# Patient Record
Sex: FEMALE
[De-identification: names, ages, dates, MRNs, and addresses within clinical notes are randomized per-mention and may not be internally consistent; named-entity substitution may affect disease eponyms.]

---

## 2017-05-11 ENCOUNTER — HOSPITAL ENCOUNTER (OUTPATIENT)
Dept: HOSPITAL 31 - C.SDS | Age: 72
Discharge: HOME | End: 2017-05-11
Payer: COMMERCIAL

## 2017-05-11 VITALS
DIASTOLIC BLOOD PRESSURE: 65 MMHG | HEART RATE: 71 BPM | SYSTOLIC BLOOD PRESSURE: 105 MMHG | OXYGEN SATURATION: 95 % | RESPIRATION RATE: 18 BRPM | TEMPERATURE: 97.6 F

## 2017-05-11 DIAGNOSIS — I83.812: Primary | ICD-10-CM

## 2017-05-11 DIAGNOSIS — D21.22: ICD-10-CM

## 2017-05-11 PROCEDURE — 27615 RESECT LEG/ANKLE TUM < 5 CM: CPT

## 2017-05-11 PROCEDURE — 88307 TISSUE EXAM BY PATHOLOGIST: CPT

## 2017-05-11 PROCEDURE — 97116 GAIT TRAINING THERAPY: CPT

## 2017-05-11 PROCEDURE — 97161 PT EVAL LOW COMPLEX 20 MIN: CPT

## 2017-05-11 PROCEDURE — 88304 TISSUE EXAM BY PATHOLOGIST: CPT

## 2017-05-11 PROCEDURE — 37765 STAB PHLEB VEINS XTR 10-20: CPT

## 2017-05-11 NOTE — OP
PROCEDURE DATE: 05/11/2017



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
nkle.



POSTOPERATIVE DIAGNOSES:  Painful varicose veins of the left leg with a soft tissue mass of the left 
ankle.



PROCEDURES PERFORMED:

1.  Stab phlebectomy of varicose veins of the left leg.

2.  Wide and deep excision soft tissue tumor of the left ankle with adjacent tissue transfer closure.




SURGEON:  Pratik Hernandez MD



ANESTHESIA:  General.



ESTIMATED BLOOD LOSS:  30 mL.



POSTOPERATIVE CONDITION:  Stable.



INDICATIONS FOR SURGERY:  This is a 72-year-old female with varicose veins and a soft tissue tumor of
 her left leg, who will now undergo operative therapy.



PROCEDURE:  The patient taken to the operating room, placed in supine position.  Preoperatively, in t
he holding area, the varicose veins had been marked with an indelible marker while she was standing u
p.  The soft tissue tumor was also marked.  A stab phlebectomy was performed of a cluster of varicose
 veins in the left knee and calf area.  Bleeding was controlled using the Bovie.  All the incisions w
ere closed using subcuticular Monocryl sutures.  Next, an elliptical incision was made over the soft 
tissue mass above the lateral left ankle.  It was dissected down to the fascia and completely removed
.  Bleeding was controlled using the Bovie.  A larger blood vessel was repaired.  Generous tissue fla
ps were raised using the Bovie and a greater than 30 square cm adjacent tissue transfer closure was p
erformed using multiple layers of Monocryl, subcuticular Monocryl, and glue.  The patient tolerated t
he procedure well, returned to recovery room in stable condition.





__________________________________________

Pratik Hernandez MD







cc:



DD: 05/11/2017 12:49:28  [id number]

TT: 05/11/2017 13:33:24

Job # [id number]

en

## 2018-07-24 ENCOUNTER — HOSPITAL ENCOUNTER (EMERGENCY)
Dept: HOSPITAL 14 - H.ER | Age: 73
Discharge: HOME | End: 2018-07-24
Payer: COMMERCIAL

## 2018-07-24 VITALS
SYSTOLIC BLOOD PRESSURE: 130 MMHG | HEART RATE: 76 BPM | RESPIRATION RATE: 18 BRPM | TEMPERATURE: 99 F | OXYGEN SATURATION: 98 % | DIASTOLIC BLOOD PRESSURE: 74 MMHG

## 2018-07-24 VITALS — BODY MASS INDEX: 29.7 KG/M2

## 2018-07-24 DIAGNOSIS — N81.2: ICD-10-CM

## 2018-07-24 DIAGNOSIS — G40.909: ICD-10-CM

## 2018-07-24 DIAGNOSIS — N39.0: Primary | ICD-10-CM

## 2018-07-24 DIAGNOSIS — I10: ICD-10-CM

## 2018-07-24 DIAGNOSIS — E78.00: ICD-10-CM

## 2018-07-24 LAB
BILIRUB UR-MCNC: NEGATIVE MG/DL
COLOR UR: YELLOW
GLUCOSE UR STRIP-MCNC: (no result) MG/DL
LEUKOCYTE ESTERASE UR-ACNC: (no result) LEU/UL
PH UR STRIP: 6 [PH] (ref 5–8)
PROT UR STRIP-MCNC: 30 MG/DL
RBC # UR STRIP: (no result) /UL
SP GR UR STRIP: 1.03 (ref 1–1.03)
SQUAMOUS EPITHIAL: 2 /HPF (ref 0–5)
URINE CLARITY: (no result)
UROBILINOGEN UR-MCNC: (no result) MG/DL (ref 0.2–1)

## 2018-07-24 NOTE — ED PDOC
HPI: Female  Pain


Time Seen by Provider: 07/24/18 14:16


Chief Complaint (Nursing): Female Genitourinary


Chief Complaint (Provider): pelvic discomfort


History Per: Patient (72 y/o female here with urinary burning noted today.  Has 

had h/o bladder prolapse with 'procedure' by gyn 5 years ago.  States she now 

notes that she feels as if her bladder is protruding from vaginal canal.  

Denies a)





Past Medical History


Reviewed: Historical Data, Nursing Documentation, Vital Signs


Vital Signs: 





 Last Vital Signs











Temp  99 F   07/24/18 14:05


 


Pulse  76   07/24/18 14:05


 


Resp  18   07/24/18 14:05


 


BP  130/74   07/24/18 14:05


 


Pulse Ox  98   07/24/18 14:05














- Medical History


PMH: Gall Bladder Disease (hx gallstones), HTN, Hypercholesterolemia, Seizures (

epilepsy  no meds at present last seizure 4 yearsa go)


   Denies: Diabetes, Chronic Kidney Disease





- Surgical History


Surgical History: Cholecystectomy





- Family History


Family History: States: No Known Family Hx





- Home Medications


Home Medications: 


 Ambulatory Orders











 Medication  Instructions  Recorded


 


Valsartan/Hydrochlorothiazide 1 each PO DAILY 05/08/17





[Valsartan-Hctz 80-12.5 mg Tab]  


 


Rosuvastatin Calcium [Crestor] 20 mg PO DAILY 11/05/17


 


Cephalexin [Keflex] 500 mg PO TID #15 capsule 07/24/18














- Allergies


Allergies/Adverse Reactions: 


 Allergies











Allergy/AdvReac Type Severity Reaction Status Date / Time


 


No Known Allergies Allergy   Verified 07/24/18 14:05














Review of Systems


ROS Statement: Except As Marked, All Systems Reviewed And Found Negative





Physical Exam





- Reviewed


Nursing Documentation Reviewed: Yes


Vital Signs Reviewed: Yes





- Physical Exam


Appears: Positive for: Well, Non-toxic, No Acute Distress


Head Exam: Positive for: ATRAUMATIC, NORMAL INSPECTION, NORMOCEPHALIC


Skin: Positive for: Normal Color, Warm, DRY


Eye Exam: Positive for: EOMI, Normal appearance, PERRL


ENT: Positive for: Normal ENT Inspection


Neck: Positive for: Normal, Painless ROM


Cardiovascular/Chest: Positive for: Regular Rate, Rhythm


Respiratory: Positive for: CNT, Normal Breath Sounds


Gastrointestinal/Abdominal: Positive for: Normal Exam, Soft


Pelvic Exam: Positive for: Other (no vaginal mass noted.)


Back: Positive for: Normal Inspection


Extremity: Positive for: Normal ROM


Neurologic/Psych: Positive for: Alert, Oriented





- Laboratory Results


Urine dip results: Positive for: Leukocyte Esterase, Blood.  Negative for: 

Nitrate, Ketones, Glucose, Bilirubin, Protein





- ECG


O2 Sat by Pulse Oximetry: 98





Disposition





- Clinical Impression


Clinical Impression: 


 Bladder prolapse, UTI (urinary tract infection)








- Patient ED Disposition


Is Patient to be Admitted: No





- Disposition


Referrals: 


Berhane Obando MD [Staff Provider] - 


[facility] [Outside]


Disposition: Routine/Home


Disposition Time: 14:29


Condition: FAIR


Prescriptions: 


Cephalexin [Keflex] 500 mg PO TID #15 capsule


Instructions:  Urinary Tract Infection, Adult (DC)

## 2019-01-29 ENCOUNTER — HOSPITAL ENCOUNTER (EMERGENCY)
Dept: HOSPITAL 14 - H.ER | Age: 74
Discharge: HOME | End: 2019-01-29
Payer: COMMERCIAL

## 2019-01-29 VITALS — SYSTOLIC BLOOD PRESSURE: 129 MMHG | DIASTOLIC BLOOD PRESSURE: 69 MMHG | HEART RATE: 62 BPM | RESPIRATION RATE: 16 BRPM

## 2019-01-29 VITALS — BODY MASS INDEX: 29.2 KG/M2

## 2019-01-29 VITALS — TEMPERATURE: 98.1 F

## 2019-01-29 VITALS — OXYGEN SATURATION: 96 %

## 2019-01-29 DIAGNOSIS — I10: Primary | ICD-10-CM

## 2019-01-29 LAB
BUN SERPL-MCNC: 19 MG/DL (ref 7–17)
CALCIUM SERPL-MCNC: 9.6 MG/DL (ref 8.4–10.2)
GFR NON-AFRICAN AMERICAN: > 60

## 2019-01-29 NOTE — ED PDOC
HPI: Hypertension/Hypotension


Chief Complaint (Provider): High Blood pressure 


Additional History Per: Patient


Additional Complaint(s): 





This is 74 y/o F with PMH of HTN and HLD comes to the ER for hypertension. 

Patient reports she stopped taking her blood pressure medications since this 

month w/o consulting with her PMD. Patient was checking her BP at home and 

systolic 160-165 which prompted her to visit ER. Patient reports mild 4/10 

headache but no blurred vision, slurred speech, palpitations, chest pain, SOB, 

or weakness. 





PMH: HTN, HLD


PSH: varicose veins and Gallbladder removed 


Allg: NKDA


FH: Denies 


SH: No smoking/alcohol or drug use 





ROS: As per HPI, no abdominal pain, dysuria, weakness. 





<Bruna Baig - Last Filed: 01/29/19 10:43>





<Zeke Schulte - Last Filed: 01/30/19 15:29>


Time Seen by Provider: 01/29/19 07:59


Chief Complaint (Nursing): High Blood Pressure





Supervising Attending Note





- Supervising Attending Note


The Documented history was done by the: Physician Extender, Attending Physician


The documented physical exam was done by the: Physician Extender, Attending 

Physician


The documented procedures were done by the: Physician Extender, Attending 

Physician





- Attestation:


I have personally seen and examined this patient.: Yes


I have fully participated in the care of the patient.: Yes


I have reviewed all pertinent clinical information, including history, physical 

exam and plan: Yes





<Zeke Schulte - Last Filed: 01/30/19 15:29>





Past Medical History


Vital Signs: 





                                Last Vital Signs











Temp  98.1 F   01/29/19 08:00


 


Pulse  75   01/29/19 08:14


 


Resp  21   01/29/19 08:14


 


BP  158/80 H  01/29/19 08:14


 


Pulse Ox  96   01/29/19 08:14














- Medical History


PMH: Gall Bladder Disease (hx gallstones), HTN, Hypercholesterolemia, Seizures 

(epilepsy  no meds at present last seizure 4 yearsa go)


   Denies: Diabetes, Chronic Kidney Disease





- Surgical History


Surgical History: Cholecystectomy





- Family History


Family History: States: No Known Family Hx





- Immunization History


Hx Tetanus Toxoid Vaccination: No





<Bruna Baig - Last Filed: 01/29/19 10:43>


Reviewed: Historical Data, Nursing Documentation, Vital Signs


Vital Signs: 





                                Last Vital Signs











Temp  98.1 F   01/29/19 10:37


 


Pulse  62   01/29/19 10:37


 


Resp  16   01/29/19 10:37


 


BP  129/69   01/29/19 10:37


 


Pulse Ox  96   01/29/19 10:43














<Zeke Schulte - Last Filed: 01/30/19 15:29>





- Home Medications


Home Medications: 


                                Ambulatory Orders











 Medication  Instructions  Recorded


 


Valsartan/Hydrochlorothiazide 1 each PO DAILY 05/08/17





[Valsartan-Hctz 80-12.5 mg Tab]  


 


RX: Rosuvastatin Calcium [Crestor] 20 mg PO DAILY 11/05/17


 


Cephalexin [Keflex] 500 mg PO TID #15 capsule 07/24/18


 


RX: Losartan [Cozaar] 50 mg PO DAILY #30 tab 01/29/19














- Allergies


Allergies/Adverse Reactions: 


                                    Allergies











Allergy/AdvReac Type Severity Reaction Status Date / Time


 


No Known Allergies Allergy   Verified 07/24/18 14:05














Review of Systems


Constitutional: Negative for: Fever


Eyes: Negative for: Pain


ENT: Negative for: Ear Pain, Ear Discharge


Cardiovascular: Negative for: Chest Pain, Palpitations


Respiratory: Negative for: Cough, Shortness of Breath, Hemoptysis


Gastrointestinal: Negative for: Nausea, Vomiting, Abdominal Pain


Genitourinary Female: Negative for: Dysuria, Frequency


Musculoskeletal: Negative for: Neck Pain, Arm Pain


Skin: Negative for: Rash


Neurological: Positive for: Headache.  Negative for: Weakness, Numbness


Psych: Negative for: Anxiety





<Bruna Baig - Last Filed: 01/29/19 10:43>


ROS Statement: Except As Marked, All Systems Reviewed And Found Negative





<Zeke Schulte A - Last Filed: 01/30/19 15:29>





Physical Exam





- Physical Exam


Appears: Positive for: No Acute Distress


Head Exam: Positive for: NORMAL INSPECTION


Skin: Positive for: Normal Color.  Negative for: Diaphoresis


Eye Exam: Positive for: Normal appearance, EOMI, PERRL


ENT: Positive for: Normal ENT Inspection, Sinus Pain/Drainage


Cardiovascular/Chest: Positive for: Regular Rate, Rhythm, Tachycardia.  Negative

 for: JVD, Murmur


Respiratory: Positive for: Normal Breath Sounds.  Negative for: Decreased Breath

 Sounds, Accessory Muscle Use, Crackles


Gastrointestinal/Abdominal: Positive for: Normal Exam, Bowel Sounds, Soft.  

Negative for: Tenderness


Back: Positive for: Normal Inspection.  Negative for: L CVA Tenderness, R CVA 

Tenderness


Extremity: Positive for: Normal ROM.  Negative for: Tenderness, Pedal Edema


Neurologic/Psych: Positive for: Alert, Oriented.  Negative for: Motor/Sensory 

Deficits





<Bruna Baig - Last Filed: 01/29/19 10:43>





- Reviewed


Nursing Documentation Reviewed: Yes


Vital Signs Reviewed: Yes





<Zeke Schulte - Last Filed: 01/30/19 15:29>





- Laboratory Results


Result Diagrams: 


                                 01/29/19 09:00





- ECG


O2 Sat by Pulse Oximetry: 96





- Progress


ED Course And Treament: 





A/P: 


74 y/o F with High blood pressure. 





- BMP


- EKG


- Losartan 50mg STAT


- Monitor BP and cardio monitoring 





Case discussed with Dr. Schulte 





EKG: NSR 











BMP reviewed 


Patient is asymptomatic 


Re-evaluation Time: 10:23


Condition: Improved





<Bruna Baig - Last Filed: 01/29/19 10:43>





- Laboratory Results


Result Diagrams: 


                                 01/29/19 09:00





<Zeke Schulte - Last Filed: 01/30/19 15:29>





Medical Decision Making


Medical Decision Making: 





High blood pressure 





<Bruna Baig - Last Filed: 01/29/19 10:43>





Disposition





- Patient ED Disposition


Is Patient to be Admitted: No





- Disposition


Disposition: Routine/Home


Disposition Time: 10:27





<Bruna Baig - Last Filed: 01/29/19 10:43>


Counseled Patient/Family Regarding: Studies Performed, Diagnosis, Need For 

Followup





<Zeke Schulte - Last Filed: 01/30/19 15:29>





- Clinical Impression


Clinical Impression: 


 High blood pressure








- Disposition


Referrals: 


Lina Marie MD [Medical Doctor] - 


Condition: STABLE


Additional Instructions: 


- Continue BP medications 


- Low sodium diet 


- Follow up with PMD in 2-3 days 


- ER precautions discussed with patient 


Prescriptions: 


RX: Losartan [Cozaar] 50 mg PO DAILY #30 tab


Instructions:  High Blood Pressure in Adults, Low Salt Diet


Print Language: ENGLISH

## 2019-01-29 NOTE — CARD
--------------- APPROVED REPORT --------------





Date of service: 01/29/2019



EKG Measurement

Heart [id number]

[id number]

[id number]

[id number]

[id number]



<Conclusion>

Normal sinus rhythm

Normal ECG